# Patient Record
Sex: MALE | Race: WHITE | NOT HISPANIC OR LATINO
[De-identification: names, ages, dates, MRNs, and addresses within clinical notes are randomized per-mention and may not be internally consistent; named-entity substitution may affect disease eponyms.]

---

## 2023-08-24 ENCOUNTER — TRANSCRIBE ORDERS (OUTPATIENT)
Dept: OTHER | Age: 23
End: 2023-08-24

## 2023-08-24 DIAGNOSIS — G40.909 SEIZURE DISORDER (H): Primary | ICD-10-CM

## 2023-09-12 ENCOUNTER — HOSPITAL ENCOUNTER (OUTPATIENT)
Facility: CLINIC | Age: 23
End: 2023-09-12
Attending: PSYCHIATRY & NEUROLOGY | Admitting: PSYCHIATRY & NEUROLOGY
Payer: COMMERCIAL

## 2023-10-20 ENCOUNTER — MEDICAL CORRESPONDENCE (OUTPATIENT)
Dept: HEALTH INFORMATION MANAGEMENT | Facility: CLINIC | Age: 23
End: 2023-10-20

## 2023-12-18 ENCOUNTER — ANCILLARY PROCEDURE (OUTPATIENT)
Dept: NEUROLOGY | Facility: CLINIC | Age: 23
End: 2023-12-18
Payer: COMMERCIAL

## 2023-12-18 ENCOUNTER — OFFICE VISIT (OUTPATIENT)
Dept: NEUROLOGY | Facility: CLINIC | Age: 23
End: 2023-12-18
Payer: COMMERCIAL

## 2023-12-18 VITALS
DIASTOLIC BLOOD PRESSURE: 66 MMHG | TEMPERATURE: 97.2 F | HEART RATE: 88 BPM | BODY MASS INDEX: 42.52 KG/M2 | SYSTOLIC BLOOD PRESSURE: 130 MMHG | WEIGHT: 255.2 LBS | HEIGHT: 65 IN

## 2023-12-18 DIAGNOSIS — G40.909 SEIZURE DISORDER (H): ICD-10-CM

## 2023-12-18 RX ORDER — LAMOTRIGINE 100 MG/1
TABLET ORAL
Qty: 270 TABLET | Refills: 3 | Status: SHIPPED | OUTPATIENT
Start: 2023-12-18 | End: 2024-08-01

## 2023-12-18 RX ORDER — LACOSAMIDE 200 MG/1
200 TABLET ORAL 2 TIMES DAILY
COMMUNITY
Start: 2023-04-25 | End: 2024-03-13

## 2023-12-18 RX ORDER — LAMOTRIGINE 25 MG/1
TABLET ORAL
Qty: 200 TABLET | Refills: 1 | Status: SHIPPED | OUTPATIENT
Start: 2023-12-18 | End: 2024-03-28

## 2023-12-18 ASSESSMENT — PAIN SCALES - GENERAL: PAINLEVEL: NO PAIN (0)

## 2023-12-18 NOTE — PATIENT INSTRUCTIONS
Impression:    Recurrent seizure like spells (he may have both seizure and non epileptic spells)   Down Syndrome   Abnormal EEG with midline epileptiform discharges     Discussion:   Juan Pablo is 23 right-handed male with Down syndrome, recurrent seizure like spells, and abnormal EEG with midline epileptiform discharges.  He has had recurrent stereotyped spells since 2018 consisting of staring, partial responsiveness, nonrhythmic upper and lower extremity shaking, hip thrusting, back arching which may last 30 to 60 minutes in duration.  Over the years he has tried many seizure medications including divalproex, levetiracetam, topiramate, and lacosamide with no meaningful improvement in the frequency of these events.  Unfortunately over the years the spells have gradually worsened and now they are occurring 3-4 times a month.  He has been reviewed with video EEG monitoring over 5 times with no target spells being recorded.  He has been followed at [facility] since 2021 by Dr. 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  MRI of the brain had some questionable changes in the left mesial  region but it is not entirely clear if he has mesial temporal sclerosis, however, not definite, EEGs are notable for midline - central epileptiform discharges.     I reviewed several videos today of his target events and they appear clinically based on my impression to be psychogenic nonepileptic spells.  Since he does have a positive EEG with focal epileptiform discharges in the midline it would be best for us to continue antiseizure medications.  It may be difficult to differentiate what is a seizure versus a psychogenic nonepileptic spells in Clement because we have not reported target spells.  He does have cognitive disability and may have challenges processing his emotions or  stressors.  On this visit I spoke extensively to mom to focus on calming him down during an event which consist of breathing, rubbing his arms, asking him to open and close his hands.  It is critical that mom maintain calmness during these events because it is most likely that Clement mirrors her emotions when she gets worried during these events.  Additionally may be helpful to try to find a therapist who works with clients with Down syndrome to identify strategies to calm him down, have hobbies or activities that may be  be stress relieving for him.  They are also planning to move to [place] at which point he will be involved in a center for daily activities which will be very helpful for him emotionally.     Additionally he does have severe fatigue on the Vimpat 200 mg twice a day requiring over 12 hours of sleep per day.  We will start him on lamotrigine, I reviewed side effects including Isaac-Arnold rash, nausea, vomiting, dizziness, arrhythmia, double vision.  I suspect lamotrigine will have added benefits of seizure control and mood stabilization which may be beneficial for him.  Additionally there is a positive history of epilepsy in the family and a genetic evaluation may be useful. He should continue lacosamide until lamotrigine is at target dose. I would treat with lamotrigine because he has abnormal EEG.     He is scheduled for an inpatient video EEG evaluation and mom would like to cancel because she has completed this numerous times with no positive field.  She would like to focus on his emotional health and seizure medication changes at this time which I believe is a reasonable approach.    Lastly I encouraged mom to talk to primary care doctor to complete a cardiac evaluation to rule out cardiac etiology causing him to collapse, especially because he has Down syndrome.  And I have ordered a genetics consult.     Plan :     Cancel inpatient video EEG admission  Continue lacosamide 200 mg twice  a day  Consult with primary care provider to get cardiac holter to rule out arrthymia   When Clement has a target event focus on calming him down, breathing, opening and closing his hand to reconnect with his body  Reduce emergency medical services unless he has significant bodily injury breathing distress or other medical concerns that require acute intervention or evaluation  Participating in an active day program with socialization will be very helpful in reducing the spells  Mental health therapy     Start Lamictal  Lamotrigine IR Script: Week 1-2: lamotrigine 25 mg am, week 3-4: 25 mg twice a day, week 5: 50 mg am and 25 mg pm. Week 6: 50 mg twice a day, week 7: 75 mg am and 50 mg pm, week 8: 75 mg am and 75 mg pm. week 9: 100 mg am and 75 mg pm. Week 10: 100 mg am and 100 mg pm. Week 11: 125 mg am and 100 mg pm. Week 12: 150 mg am and 100 mg pm. Week 13: 175 mg am and 100 mg pm. Week 14 -onward: 200 mg am and 100 mg pm.        Times of Days           Medication Tablet Size Number of Tablets/Capsules Notes      Lamotrigine      7 AM after breakfast  (Morning)   6 PM after dinner  (Night)        Week 1-2      25 mg  (1 tablet)      0 mg        Week 3-4       25 mg   (1 tablet)    25 mg   (1 tablet)       Week 5      50 mg   (2 tablets)   25 mg   (1 tablet)       Week 6      50 mg   (2 tablets)   50 mg   (2 tablets)       Week 7     75 mg   (3 tablets)   50 mg   (2 tablets)       Week 8      75 mg   (3 tablets)   75 mg   (3 tablets)      Week  9   100 mg   (4 tablets)  75 mg   (3 tablets)     Week 10   100 mg   (4 tablets)  100 mg   (4 tablets)     Week 11   125 mg   (5 tablets)  100 mg   (4 tablets)     Week 12   150 mg     100 mg       Week 13   175 mg     100 mg        Week 14   200 mg     100 mg          Monitor for side effects, especially rash (such as [place] Arnold rash which can be life threatening) and mood changes, if any concerns please call our office. [phone] if he has a rash - if he has a  rash he will need to take loratadine 10 mg daily, reduce lamotrigine to week prior, and call [facility], take a picture of the rash and send to [facility]      Follow up  Olivia in 10 weeks via video visit or phone call.  Then with Dr. Charles 2-3 months    The patient received guidance on maintaining appropriate seizure precautions. [place]'s driving regulations were discussed, ensuring the patient's understanding of the following restrictions: They are prohibited from operating a motor vehicle within 3 months following any seizure or episode involving sudden unconsciousness or an inability to sit up. Furthermore, they are required to report their most recent seizure to the DMV within 30 days after the event.  The patient was also advised to steer clear of activities that could potentially result in self-injury or harm to others within 3 months following any seizure with impaired awareness or impaired motor control. These activities encompass, but are not limited to, operating power tools, handling firearms, climbing ladders, trees, or engaging in activities at heights that pose a fall risk. They should refrain from operating power tools or heavy machinery and equipment. Additionally, the patient was counseled against swimming alone, and they should not bathe in any type of tub, including bathtubs, jacuzzis, or hot tubs, unless there is a responsible adult present nearby to provide assistance in the event of a seizure to prevent drowning. Lastly, they were advised not to work with or near hot surfaces such as stoves, ovens, or scalding hot water to minimize risks associated with impaired awareness or motor control during seizures.    Letha Charles MD

## 2023-12-18 NOTE — LETTER
2023       RE: Clement George  : 2000   MRN: [id number]      Dear Colleague,    Thank you for referring your patient, Clement George, to the [facility] EPILEPSY CARE at [facility]. Please see a copy of my visit note below.    [facility] and Physical       Patient:  Clement George  :  2000   Age:  23 year old   Today's Office Visit:  2023    Referring Provider:    Referred Self, MD  No address on file    History of Present Illness:  Kendra (mom and POA) came for this visit. Clement George is 23 year old right handed who presents with seizure like spells. These spells started 2018 (age 18). Over the years the spells have worsened. He started lacosamide 2022 and this has not helped.  Mom states that he has had extensive video EEG and ambulatory EEG with video completed with no target spells recorded.  He does have epileptiform discharges midline at Cz.  There is a strong family history of epilepsy with mom having seizures that are generalized tonic-clonic seizures and grandmother had generalized tonic-clonic seizures.   It seems over the last 5 years they have tried different medication options and multiple attempts to capture target spells which have been ineffective.  He continues to have 3-4 spells per month that are extremely distressing to him and mom.  They have seen 3 neurologists that have not been able to identify what these target spells are and the spells are unresponsive to medications.  He is currently on lacosamide 200 mg twice a day and is very sleepy on it.   He does not have a known history of generalized tonic-clonic seizures or episodes of staring with unresponsiveness.  Mom states he goes to sleep at 6 PM at night and wakes up the next day around 7 AM getting in approximately 12 hours of sleep at night and he may take a nap during the daytime.  He is sensitive to his seizure  "medications.  He stopped working in the spring 2023 and he has not had a lot to do at home.  They are moving to [place] so he can be engaged in a day program called shared living which has activities during the day and clients that may have other medical issues allowing him to have a community.  Mom states he has not had significant life trauma in his life.  Home specifically she is  and main caregiver and he lives with her.  Dad intermittently does see him and they are able to spend time together.      EMR NOTES:   Overall there are many medical records in the chart.  He has had 3 video EEG admission at [facility] EEG admission lasting 3 to 7 days in duration.  He had 2 home video EEG evaluations with each evaluation being approximately 3 days in duration.  During all of these video EEG evaluations he did not have target spells.  It seems early on in February 2023 [facility] notes state that their impression is he has focal epilepsy symptomatic, without status epilepticus.  As to review the [facility] notes it seems that seizure medications over time were not efficacious and he continued to have spells.   [facility] notes from spring and summer 2023 use language stating that they think spells may be consistent with psychogenic nonepileptic spells and this was reviewed with mom.  However I am not able to find a detailed note from the [facility] visits.  \"There are features of these events that suggest the possibility of non-epileptic events. We have done multiple prolonged EEGs, and he has not had an event on prolonged EEG despite our best attempts. I told the ED physicians that if Clement is safe and comfortable at home, breathing on his, without signs of injury, then these may be able to be managed at home and that he does not necessarily need to come to the ED with each of these. I also stated that it still would be helpful to try and record one of these events. Therefore, I will order another at-home EEG, this time " "as a 5 day study. Hopefully, we will be able to capture an event, as it sounds like they are currently occurring about daily.   Electronically signed by Marcy Ghotra M.D. at 11/17/2023 8:22 PM CST \"    \"I called to update the patient's mom on his Stratus EEG, which recorded no events. I shared with the mother that I am concerned these are nonepileptic events, as we have done 3 EMU stays and 2 [facility] EEG studies and not recorded any events. In addition, his mother shared that at times she can talk him out of the events. She is quite frustrated and continues to think that these are likely seizures. She inquires about further evaluation or options for management at any Center. I discussed how it may be helpful for him to be managed locally given the logistics traveling for evaluations and EEGs. However, they have already been evaluated by the closest neurologist who referred them to us. At this time, I have recommended her video taping further events with her cell phone and sending them in. I am happy to review these. I think he can continue lacosamide for now given the interictal activity seen on EEG.\"  Electronically signed by Marcy Ghotra M.D. at 05/30/2023 4:36 PM CDT    Spell/Seizure type 1:  Target spells are described as no warning sign and he may fall off a chair or get to the ground when he has the spells.  During the time his eyes are open, he has nonrhythmic shaking of his upper and lower extremity with some truncal and axial shaking.  There is no obvious oral or hand automatisms, there is no foaming at the mouth, there is no tongue biting, there is no bodily injury.  There was rare instances where he fell off a chair and might have scraped his knee.  The spells are especially noted to happen early morning after waking up or late in the evening around suppertime.  Mom has not specifically noticed that he has more spells during acute stress times.   During target spells if mom were to ask him to " "squeeze her hand he is able to squeeze her hand and does appear to have a maintain level of awareness. Frequency 3-4 per month. Duration 30-60 minutes.  Other descriptions in the medical notes are \"They are typified by staring and reduced awareness with a possible right head turn and possibly back arching and hip thrusting. Eyes are open with extremity twitching lasting 5-10 seconds but then waxing and waning and occurring intermittently over a period of about 45 minutes.\"     Epilepsy Risk Factors:    He does have Down syndrome  and had developmental delays in speech and walking well withPatient has no history of encephalitis/meningitis, no history of stroke, no history of tumor, no history of traumatic brain injury.  The patient had a normal birth and delivery.  No developmental delays noted.  No febrile seizures.  Mom has epilepsy (mom takes carbamazepine and she has generalized tonic-clonic convulsion), grandmother had seizure (generalized tonic-clonic convulsion she took carbamazepine and phenytoin).   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  Family history:    Mom has epilepsy (mom takes carbamazepine and she has generalized tonic-clonic convulsion), grandmother had seizure (generalized tonic-clonic convulsion she took carbamazepine and phenytoin).   Psycho-Social History: Clement George currently lives with mom. He would like to work and be busy. Mom is not sure if he is depressed. Does not " smoke, no alcohol use, no recreational drug use. We reviewed importance of mental and emotional wellbeing and impact on health. He stopped working May 2023. y 12 hours of sleep at night and he may take a nap during the daytime.  He is sensitive to his seizure medications.  He stopped working in the spring 2023 and he has not had a lot to do at home.  They are moving to [place] so he can be engaged in a day program called shared living which has activities during the day and clients that may have other medical issues allowing him to have a community.  Mom states he has not had significant life trauma in his life.  Home specifically she is  and main caregiver and he lives with her.  Dad intermittently does see him and they are able to spend time together.     Driving:  Currently patient is:  Not driving.  Previous Evaluations for Epilepsy:    EE2023: CLINICAL INTERPRETATION   The EEG showed:   1) Rare Cz spikes during sleep that may suggest a tendency for focal   seizures in the appropriate clinical context.   2) Mild nonspecific diffuse slowing of the background.     No typical events were captured during the recording period.     EEG CLASSIFICATION   SPECIAL STUDY - Long-term video EEG monitoring.   Dysrhythmia grade 1 generalized.   Sleep - activation of Cz spikes.   EKG channel.     EEG REPORT   Background   The awake recording revealed 11 Hz posterior dominant rhythm.  There is   abundant 6-7 hertz diffuse slowing.  The sleep recording contained normal   symmetric sleep architecture.        Discharges and seizures   INTERICTAL DISCHARGES: During sleep, there are rare low-amplitude midline   central (Cz) sharp wave discharges which at times occur in trains.       CLINICAL EVENTS:  During the monitoring session, the patient had no   clinical or push button events of concern.       The EKG was unremarkable.      Cz spike vs V-wave example    EE2023  INTERPRETATION AND CLINICAL CORRELATE  "  This computer-assisted prolonged video EEG recording showed:   1. Mild diffuse slowing which is a non specific indicator of a mild global   disturbance of cerebral function   2. Rare sleep activated midline central potentially epileptiform   discharges during sleep indicating a propensity for focal seizures from   this region   3. No events or seizures recorded.         MRI of Brain: 10/31/2022: FINDINGS: Unchanged since the prior study. No evidence of mass, heterotopia or focal cortical dysplasia. The body of the left hippocampus is slightly smaller than the right (350/84, 650/87,  3/55), but the T2 signal within the left hippocampus is normal. Therefore, mesial temporal sclerosis  is unlikely. Few tiny perivascular spaces in the rostral cerebral white matter. Mild to moderate  fluid in the right mastoid air cells and mild within the left. No other definite abnormality, with  the exam limited due to patient motion artifact.    IMPRESSION:  Cannot entirely exclude left-sided mesial temporal sclerosis. Otherwise nothing  structural to explain the patient's seizures.   Exam:    /66 (BP Location: Right arm, Patient Position: Sitting, Cuff Size: Adult Large)   Pulse 88   Temp 97.2  F (36.2  C) (Temporal)   Ht 5' 5\" (165.1 cm)   Wt 255 lb 3.2 oz (115.8 kg)   BMI 42.47 kg/m       Wt Readings from Last 5 Encounters:   12/18/23 255 lb 3.2 oz (115.8 kg)       Alert, cognitive delay, smiling, cooperative,  Down syndrome's features, face is symmetric, no upper or lower  extremity weakness, wide-based gait.   No sensory loss in upper and lower extremities.     Impression:    Recurrent seizure like spells (he may have both seizure and non epileptic spells)   Down Syndrome   Abnormal EEG with midline epileptiform discharges     Discussion:   Juan Pablo is 23 right-handed male with Down syndrome, recurrent seizure like spells, and abnormal EEG with midline epileptiform discharges.  He has had recurrent stereotyped spells " since 2018 consisting of staring, partial responsiveness, nonrhythmic upper and lower extremity shaking, hip thrusting, back arching which may last 30 to 60 minutes in duration.  Over the years he has tried many seizure medications including divalproex, levetiracetam, topiramate, and lacosamide with no meaningful improvement in the frequency of these events.  Unfortunately over the years the spells have gradually worsened and now they are occurring 3-4 times a month.  He has been reviewed with video EEG monitoring over 5 times with no target spells being recorded.  He has been followed at [facility] since 2021 by Dr. 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  MRI of the brain had some questionable changes in the left mesial  region but it is not entirely clear if he has mesial temporal sclerosis, however, not definite, EEGs are notable for midline - central epileptiform discharges.     I reviewed several videos today of his target events and they appear clinically based on my impression to be psychogenic nonepileptic spells.  Since he does have a positive EEG with focal epileptiform discharges in the midline it would be best for us to continue antiseizure medications.  It may be difficult to differentiate what is a seizure versus a psychogenic nonepileptic spells in Clement because we have not reported target spells.  He does have cognitive disability and may have challenges processing his emotions or stressors.  On this visit I spoke extensively to mom to focus on calming him down during an event which consist of breathing, rubbing his arms, asking him to open and close his hands.  It is critical that mom maintain calmness during these events because it is most likely that Clement mirrors her emotions when she gets worried during these events.  Additionally may be  helpful to try to find a therapist who works with clients with Down syndrome to identify strategies to calm him down, have hobbies or activities that may be  be stress relieving for him.  They are also planning to move to [place] at which point he will be involved in a center for daily activities which will be very helpful for him emotionally.     Additionally he does have severe fatigue on the Vimpat 200 mg twice a day requiring over 12 hours of sleep per day.  We will start him on lamotrigine, I reviewed side effects including Isaac-Arnold rash, nausea, vomiting, dizziness, arrhythmia, double vision.  I suspect lamotrigine will have added benefits of seizure control and mood stabilization which may be beneficial for him.  Additionally there is a positive history of epilepsy in the family and a genetic evaluation may be useful. He should continue lacosamide until lamotrigine is at target dose. I would treat with lamotrigine because he has abnormal EEG.     He is scheduled for an inpatient video EEG evaluation and mom would like to cancel because she has completed this numerous times with no positive field.  She would like to focus on his emotional health and seizure medication changes at this time which I believe is a reasonable approach.    Lastly I encouraged mom to talk to primary care doctor to complete a cardiac evaluation to rule out cardiac etiology causing him to collapse, especially because he has Down syndrome.  And I have ordered a genetics consult.       Plan :     Cancel inpatient video EEG admission  Continue lacosamide 200 mg twice a day  Consult with primary care provider to get cardiac holter to rule out arrthymia   When Clement has a target event focus on calming him down, breathing, opening and closing his hand to reconnect with his body  Reduce emergency medical services unless he has significant bodily injury breathing distress or other medical concerns that require acute intervention or  evaluation  Participating in an active day program with socialization will be very helpful in reducing the spells    Start Lamictal  Lamotrigine IR Script: Week 1-2: lamotrigine 25 mg am, week 3-4: 25 mg twice a day, week 5: 50 mg am and 25 mg pm. Week 6: 50 mg twice a day, week 7: 75 mg am and 50 mg pm, week 8: 75 mg am and 75 mg pm. week 9: 100 mg am and 75 mg pm. Week 10: 100 mg am and 100 mg pm. Week 11: 125 mg am and 100 mg pm. Week 12: 150 mg am and 100 mg pm. Week 13: 175 mg am and 100 mg pm. Week 14 -onward: 200 mg am and 100 mg pm.        Times of Days           Medication Tablet Size Number of Tablets/Capsules Notes      Lamotrigine      7 AM after breakfast  (Morning)   6 PM after dinner  (Night)        Week 1-2      25 mg  (1 tablet)      0 mg        Week 3-4       25 mg   (1 tablet)    25 mg   (1 tablet)       Week 5      50 mg   (2 tablets)   25 mg   (1 tablet)       Week 6      50 mg   (2 tablets)   50 mg   (2 tablets)       Week 7     75 mg   (3 tablets)   50 mg   (2 tablets)       Week 8      75 mg   (3 tablets)   75 mg   (3 tablets)      Week  9   100 mg   (4 tablets)  75 mg   (3 tablets)     Week 10   100 mg   (4 tablets)  100 mg   (4 tablets)     Week 11   125 mg   (5 tablets)  100 mg   (4 tablets)     Week 12   150 mg     100 mg       Week 13   175 mg     100 mg        Week 14   200 mg     100 mg          Monitor for side effects, especially rash (such as [place] Arnold rash which can be life threatening) and mood changes, if any concerns please call our office. [phone] if he has a rash - if he has a rash he will need to take loratadine 10 mg daily, reduce lamotrigine to week prior, and call [facility], take a picture of the rash and send to [facility]        Follow up  Olivia in 10 weeks via video visit or phone call.  Then with Dr. Charles 2-3 months    The patient received guidance on maintaining appropriate seizure precautions. [place]'s driving regulations were discussed, ensuring the  patient's understanding of the following restrictions: They are prohibited from operating a motor vehicle within 3 months following any seizure or episode involving sudden unconsciousness or an inability to sit up. Furthermore, they are required to report their most recent seizure to the DMV within 30 days after the event.  The patient was also advised to steer clear of activities that could potentially result in self-injury or harm to others within 3 months following any seizure with impaired awareness or impaired motor control. These activities encompass, but are not limited to, operating power tools, handling firearms, climbing ladders, trees, or engaging in activities at heights that pose a fall risk. They should refrain from operating power tools or heavy machinery and equipment. Additionally, the patient was counseled against swimming alone, and they should not bathe in any type of tub, including bathtubs, jacuzzis, or hot tubs, unless there is a responsible adult present nearby to provide assistance in the event of a seizure to prevent drowning. Lastly, they were advised not to work with or near hot surfaces such as stoves, ovens, or scalding hot water to minimize risks associated with impaired awareness or motor control during seizures.    I spent 71minutes in total today to provide comprehensive  medical care.   I spent 6 minutes writing the note and placing orders.   I spent 6 minutes  reviewing the chart.     The rest of the time was spent with the patient in face to face interview. During this time key medical decisions were made with review of medical chart prior to visit, visit with patient, counseling/education, and post visit work, including documentation of note on the day of visit. I addressed all questions the patient/caregiver raised in regards to epilepsy or related medical questions.         Again, thank you for allowing me to participate in the care of your patient.      Sincerely,    Letha HIGH  MD [place]

## 2023-12-18 NOTE — PROGRESS NOTES
[facility] Epilepsy Care History and Physical       Patient:  Clement George  :  2000   Age:  23 year old   Today's Office Visit:  2023    Referring Provider:    Referred Self, MD  No address on file    History of Present Illness:  Kendra (mom and POA) came for this visit. Clement George is 23 year old right handed who presents with seizure like spells. These spells started 2018 (age 18). Over the years the spells have worsened. He started lacosamide 2022 and this has not helped.  Mom states that he has had extensive video EEG and ambulatory EEG with video completed with no target spells recorded.  He does have epileptiform discharges midline at Cz.  There is a strong family history of epilepsy with mom having seizures that are generalized tonic-clonic seizures and grandmother had generalized tonic-clonic seizures.   It seems over the last 5 years they have tried different medication options and multiple attempts to capture target spells which have been ineffective.  He continues to have 3-4 spells per month that are extremely distressing to him and mom.  They have seen 3 neurologists that have not been able to identify what these target spells are and the spells are unresponsive to medications.  He is currently on lacosamide 200 mg twice a day and is very sleepy on it.   He does not have a known history of generalized tonic-clonic seizures or episodes of staring with unresponsiveness.  Mom states he goes to sleep at 6 PM at night and wakes up the next day around 7 AM getting in approximately 12 hours of sleep at night and he may take a nap during the daytime.  He is sensitive to his seizure medications.  He stopped working in the spring 2023 and he has not had a lot to do at home.  They are moving to [place] so he can be engaged in a day program called shared living which has activities during the day and clients that may have other medical issues allowing him to have a community.  Mom  "states he has not had significant life trauma in his life.  Home specifically she is  and main caregiver and he lives with her.  Dad intermittently does see him and they are able to spend time together.      EMR NOTES:   Overall there are many medical records in the chart.  He has had 3 video EEG admission at [facility] EEG admission lasting 3 to 7 days in duration.  He had 2 home video EEG evaluations with each evaluation being approximately 3 days in duration.  During all of these video EEG evaluations he did not have target spells.  It seems early on in February 2023 [facility] notes state that their impression is he has focal epilepsy symptomatic, without status epilepticus.  As to review the [facility] notes it seems that seizure medications over time were not efficacious and he continued to have spells.   [facility] notes from spring and summer 2023 use language stating that they think spells may be consistent with psychogenic nonepileptic spells and this was reviewed with mom.  However I am not able to find a detailed note from the [facility] visits.  \"There are features of these events that suggest the possibility of non-epileptic events. We have done multiple prolonged EEGs, and he has not had an event on prolonged EEG despite our best attempts. I told the ED physicians that if Clement is safe and comfortable at home, breathing on his, without signs of injury, then these may be able to be managed at home and that he does not necessarily need to come to the ED with each of these. I also stated that it still would be helpful to try and record one of these events. Therefore, I will order another at-home EEG, this time as a 5 day study. Hopefully, we will be able to capture an event, as it sounds like they are currently occurring about daily.   Electronically signed by Marcy Ghotra M.D. at 11/17/2023 8:22 PM CST \"    \"I called to update the patient's mom on his Stratus EEG, which recorded no events. I shared with " "the mother that I am concerned these are nonepileptic events, as we have done 3 EMU stays and 2 Str[facility] EEG studies and not recorded any events. In addition, his mother shared that at times she can talk him out of the events. She is quite frustrated and continues to think that these are likely seizures. She inquires about further evaluation or options for management at any Center. I discussed how it may be helpful for him to be managed locally given the logistics traveling for evaluations and EEGs. However, they have already been evaluated by the closest neurologist who referred them to us. At this time, I have recommended her video taping further events with her cell phone and sending them in. I am happy to review these. I think he can continue lacosamide for now given the interictal activity seen on EEG.\"  Electronically signed by Marcy Ghotra M.D. at 05/30/2023 4:36 PM CDT    Spell/Seizure type 1:  Target spells are described as no warning sign and he may fall off a chair or get to the ground when he has the spells.  During the time his eyes are open, he has nonrhythmic shaking of his upper and lower extremity with some truncal and axial shaking.  There is no obvious oral or hand automatisms, there is no foaming at the mouth, there is no tongue biting, there is no bodily injury.  There was rare instances where he fell off a chair and might have scraped his knee.  The spells are especially noted to happen early morning after waking up or late in the evening around suppertime.  Mom has not specifically noticed that he has more spells during acute stress times.   During target spells if mom were to ask him to squeeze her hand he is able to squeeze her hand and does appear to have a maintain level of awareness. Frequency 3-4 per month. Duration 30-60 minutes.  Other descriptions in the medical notes are \"They are typified by staring and reduced awareness with a possible right head turn and possibly back arching " "and hip thrusting. Eyes are open with extremity twitching lasting 5-10 seconds but then waxing and waning and occurring intermittently over a period of about 45 minutes.\"     Epilepsy Risk Factors:    He does have Down syndrome  and had developmental delays in speech and walking well withPatient has no history of encephalitis/meningitis, no history of stroke, no history of tumor, no history of traumatic brain injury.  The patient had a normal birth and delivery.  No developmental delays noted.  No febrile seizures.  Mom has epilepsy (mom takes carbamazepine and she has generalized tonic-clonic convulsion), grandmother had seizure (generalized tonic-clonic convulsion she took carbamazepine and phenytoin).   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  Family history:    Mom has epilepsy (mom takes carbamazepine and she has generalized tonic-clonic convulsion), grandmother had seizure (generalized tonic-clonic convulsion she took carbamazepine and phenytoin).   Psycho-Social History: Clement George currently lives with mom. He would like to work and be busy. Mom is not sure if he is depressed. Does not smoke, no alcohol use, no recreational drug use. We reviewed importance of mental and emotional wellbeing and impact on health. He stopped working May 2023. y 12 hours of sleep at night and he may take a nap during the daytime.  He is sensitive to his seizure medications.  He stopped working in the " spring 2023 and he has not had a lot to do at home.  They are moving to [place] so he can be engaged in a day program called shared living which has activities during the day and clients that may have other medical issues allowing him to have a community.  Mom states he has not had significant life trauma in his life.  Home specifically she is  and main caregiver and he lives with her.  Dad intermittently does see him and they are able to spend time together.     Driving:  Currently patient is:  Not driving.  Previous Evaluations for Epilepsy:    EE2023: CLINICAL INTERPRETATION   The EEG showed:   1) Rare Cz spikes during sleep that may suggest a tendency for focal   seizures in the appropriate clinical context.   2) Mild nonspecific diffuse slowing of the background.     No typical events were captured during the recording period.     EEG CLASSIFICATION   SPECIAL STUDY - Long-term video EEG monitoring.   Dysrhythmia grade 1 generalized.   Sleep - activation of Cz spikes.   EKG channel.     EEG REPORT   Background   The awake recording revealed 11 Hz posterior dominant rhythm.  There is   abundant 6-7 hertz diffuse slowing.  The sleep recording contained normal   symmetric sleep architecture.        Discharges and seizures   INTERICTAL DISCHARGES: During sleep, there are rare low-amplitude midline   central (Cz) sharp wave discharges which at times occur in trains.       CLINICAL EVENTS:  During the monitoring session, the patient had no   clinical or push button events of concern.       The EKG was unremarkable.      Cz spike vs V-wave example    EE2023  INTERPRETATION AND CLINICAL CORRELATE   This computer-assisted prolonged video EEG recording showed:   1. Mild diffuse slowing which is a non specific indicator of a mild global   disturbance of cerebral function   2. Rare sleep activated midline central potentially epileptiform   discharges during sleep indicating a propensity for focal  "seizures from   this region   3. No events or seizures recorded.         MRI of Brain: 10/31/2022: FINDINGS: Unchanged since the prior study. No evidence of mass, heterotopia or focal cortical dysplasia. The body of the left hippocampus is slightly smaller than the right (350/84, 650/87,  3/55), but the T2 signal within the left hippocampus is normal. Therefore, mesial temporal sclerosis  is unlikely. Few tiny perivascular spaces in the rostral cerebral white matter. Mild to moderate  fluid in the right mastoid air cells and mild within the left. No other definite abnormality, with  the exam limited due to patient motion artifact.    IMPRESSION:  Cannot entirely exclude left-sided mesial temporal sclerosis. Otherwise nothing  structural to explain the patient's seizures.   Exam:    /66 (BP Location: Right arm, Patient Position: Sitting, Cuff Size: Adult Large)   Pulse 88   Temp 97.2  F (36.2  C) (Temporal)   Ht 5' 5\" (165.1 cm)   Wt 255 lb 3.2 oz (115.8 kg)   BMI 42.47 kg/m       Wt Readings from Last 5 Encounters:   12/18/23 255 lb 3.2 oz (115.8 kg)       Alert, cognitive delay, smiling, cooperative,  Down syndrome's features, face is symmetric, no upper or lower  extremity weakness, wide-based gait.   No sensory loss in upper and lower extremities.     Impression:    Recurrent seizure like spells (he may have both seizure and non epileptic spells)   Down Syndrome   Abnormal EEG with midline epileptiform discharges     Discussion:   Juan Pablo is 23 right-handed male with Down syndrome, recurrent seizure like spells, and abnormal EEG with midline epileptiform discharges.  He has had recurrent stereotyped spells since 2018 consisting of staring, partial responsiveness, nonrhythmic upper and lower extremity shaking, hip thrusting, back arching which may last 30 to 60 minutes in duration.  Over the years he has tried many seizure medications including divalproex, levetiracetam, topiramate, and lacosamide with " no meaningful improvement in the frequency of these events.  Unfortunately over the years the spells have gradually worsened and now they are occurring 3-4 times a month.  He has been reviewed with video EEG monitoring over 5 times with no target spells being recorded.  He has been followed at [facility] since 2021 by Dr. 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  MRI of the brain had some questionable changes in the left mesial  region but it is not entirely clear if he has mesial temporal sclerosis, however, not definite, EEGs are notable for midline - central epileptiform discharges.     I reviewed several videos today of his target events and they appear clinically based on my impression to be psychogenic nonepileptic spells.  Since he does have a positive EEG with focal epileptiform discharges in the midline it would be best for us to continue antiseizure medications.  It may be difficult to differentiate what is a seizure versus a psychogenic nonepileptic spells in Clement because we have not reported target spells.  He does have cognitive disability and may have challenges processing his emotions or stressors.  On this visit I spoke extensively to mom to focus on calming him down during an event which consist of breathing, rubbing his arms, asking him to open and close his hands.  It is critical that mom maintain calmness during these events because it is most likely that Clement mirrors her emotions when she gets worried during these events.  Additionally may be helpful to try to find a therapist who works with clients with Down syndrome to identify strategies to calm him down, have hobbies or activities that may be  be stress relieving for him.  They are also planning to move to [place] at which point he will be involved in a center for daily  activities which will be very helpful for him emotionally.     Additionally he does have severe fatigue on the Vimpat 200 mg twice a day requiring over 12 hours of sleep per day.  We will start him on lamotrigine, I reviewed side effects including Isaac-Arnold rash, nausea, vomiting, dizziness, arrhythmia, double vision.  I suspect lamotrigine will have added benefits of seizure control and mood stabilization which may be beneficial for him.  Additionally there is a positive history of epilepsy in the family and a genetic evaluation may be useful. He should continue lacosamide until lamotrigine is at target dose. I would treat with lamotrigine because he has abnormal EEG.     He is scheduled for an inpatient video EEG evaluation and mom would like to cancel because she has completed this numerous times with no positive field.  She would like to focus on his emotional health and seizure medication changes at this time which I believe is a reasonable approach.    Lastly I encouraged mom to talk to primary care doctor to complete a cardiac evaluation to rule out cardiac etiology causing him to collapse, especially because he has Down syndrome.  And I have ordered a genetics consult.       Plan :     Cancel inpatient video EEG admission  Continue lacosamide 200 mg twice a day  Consult with primary care provider to get cardiac holter to rule out arrthymia   When Clement has a target event focus on calming him down, breathing, opening and closing his hand to reconnect with his body  Reduce emergency medical services unless he has significant bodily injury breathing distress or other medical concerns that require acute intervention or evaluation  Participating in an active day program with socialization will be very helpful in reducing the spells    Start Lamictal  Lamotrigine IR Script: Week 1-2: lamotrigine 25 mg am, week 3-4: 25 mg twice a day, week 5: 50 mg am and 25 mg pm. Week 6: 50 mg twice a day, week 7: 75 mg am  and 50 mg pm, week 8: 75 mg am and 75 mg pm. week 9: 100 mg am and 75 mg pm. Week 10: 100 mg am and 100 mg pm. Week 11: 125 mg am and 100 mg pm. Week 12: 150 mg am and 100 mg pm. Week 13: 175 mg am and 100 mg pm. Week 14 -onward: 200 mg am and 100 mg pm.        Times of Days           Medication Tablet Size Number of Tablets/Capsules Notes      Lamotrigine      7 AM after breakfast  (Morning)   6 PM after dinner  (Night)        Week 1-2      25 mg  (1 tablet)      0 mg        Week 3-4       25 mg   (1 tablet)    25 mg   (1 tablet)       Week 5      50 mg   (2 tablets)   25 mg   (1 tablet)       Week 6      50 mg   (2 tablets)   50 mg   (2 tablets)       Week 7     75 mg   (3 tablets)   50 mg   (2 tablets)       Week 8      75 mg   (3 tablets)   75 mg   (3 tablets)      Week  9   100 mg   (4 tablets)  75 mg   (3 tablets)     Week 10   100 mg   (4 tablets)  100 mg   (4 tablets)     Week 11   125 mg   (5 tablets)  100 mg   (4 tablets)     Week 12   150 mg     100 mg       Week 13   175 mg     100 mg        Week 14   200 mg     100 mg          Monitor for side effects, especially rash (such as [place] Arnold rash which can be life threatening) and mood changes, if any concerns please call our office. [phone] if he has a rash - if he has a rash he will need to take loratadine 10 mg daily, reduce lamotrigine to week prior, and call [facility], take a picture of the rash and send to [facility]        Follow up  Olivia in 10 weeks via video visit or phone call.  Then with Dr. Charles 2-3 months    The patient received guidance on maintaining appropriate seizure precautions. [place]'s driving regulations were discussed, ensuring the patient's understanding of the following restrictions: They are prohibited from operating a motor vehicle within 3 months following any seizure or episode involving sudden unconsciousness or an inability to sit up. Furthermore, they are required to report their most recent seizure to the [facility]  within 30 days after the event.  The patient was also advised to steer clear of activities that could potentially result in self-injury or harm to others within 3 months following any seizure with impaired awareness or impaired motor control. These activities encompass, but are not limited to, operating power tools, handling firearms, climbing ladders, trees, or engaging in activities at heights that pose a fall risk. They should refrain from operating power tools or heavy machinery and equipment. Additionally, the patient was counseled against swimming alone, and they should not bathe in any type of tub, including bathtubs, jacuzzis, or hot tubs, unless there is a responsible adult present nearby to provide assistance in the event of a seizure to prevent drowning. Lastly, they were advised not to work with or near hot surfaces such as stoves, ovens, or scalding hot water to minimize risks associated with impaired awareness or motor control during seizures.    I spent 71minutes in total today to provide comprehensive  medical care.   I spent 6 minutes writing the note and placing orders.   I spent 6 minutes  reviewing the chart.     The rest of the time was spent with the patient in face to face interview. During this time key medical decisions were made with review of medical chart prior to visit, visit with patient, counseling/education, and post visit work, including documentation of note on the day of visit. I addressed all questions the patient/caregiver raised in regards to epilepsy or related medical questions.       Letha Charles MD   Epilepsy Staff

## 2023-12-21 ENCOUNTER — TELEPHONE (OUTPATIENT)
Dept: CONSULT | Facility: CLINIC | Age: 23
End: 2023-12-21

## 2023-12-21 NOTE — TELEPHONE ENCOUNTER
DOROTHEAM for patient to call back to schedule new patient Genetics appointment with Dr. Raymond, Dr. Urena, Dr. Sandoval, or Dr. Owens, with GC visit prior. When patient calls back, please assist in scheduling IN PERSON appointment. If patient requests virtual visit, please route note back to Genetics scheduling pool for approval prior to scheduling.     Please advise patient to complete intake form via [employer],  as well as have outside records/previous genetic test reports sent prior to appointment date. Thank you.

## 2024-03-13 ENCOUNTER — VIRTUAL VISIT (OUTPATIENT)
Dept: NEUROLOGY | Facility: CLINIC | Age: 24
End: 2024-03-13
Payer: COMMERCIAL

## 2024-03-13 VITALS — BODY MASS INDEX: 41.65 KG/M2 | HEIGHT: 65 IN | WEIGHT: 250 LBS

## 2024-03-13 DIAGNOSIS — R40.4 NONSPECIFIC PAROXYSMAL SPELL: Primary | ICD-10-CM

## 2024-03-13 RX ORDER — LACOSAMIDE 200 MG/1
200 TABLET ORAL 2 TIMES DAILY
Qty: 180 TABLET | Refills: 1 | Status: SHIPPED | OUTPATIENT
Start: 2024-03-13 | End: 2024-08-01

## 2024-03-13 ASSESSMENT — PAIN SCALES - GENERAL: PAINLEVEL: NO PAIN (0)

## 2024-03-13 NOTE — NURSING NOTE
Patient is also taking a Probiotic daily.    Unable to complete PHQ-2 with patient.       Is the patient currently in the state of [place]? NO, in [place] where they live.    Visit mode:VIDEO    If the visit is dropped, the patient can be reconnected by: VIDEO VISIT: Text to cell phone:   Telephone Information:   Mobile [phone]       Will anyone else be joining the visit? YES: How would they like to receive their invitation? Text to cell phone: [phone] Kendra-Pt's Mother    (If patient encounters technical issues they should call [phone]    How would you like to obtain your AVS? MyChart    Are changes needed to the allergy or medication list? Yes ADD MEDICATION- Probiotic daily.    Reason for visit: RECHECK      Kayleen ROWAN

## 2024-03-13 NOTE — PROGRESS NOTES
"Virtual Visit Details    Type of service:  Video Visit   Video Start: 10:25 am   Video End: 10:59 am   Originating Location (pt. Location): Home    Distant Location (provider location):  On-site  Platform used for Video Visit: Al    [facility] Progress Note    Patient:  Clement George  :  2000   Age:  23 year old   Today's Office Visit:  3/13/2024       Interval History:   Kendra (mom and POA) was present for this visit. We started lamotrigine last visit. He stopped having spells, his last spell was . Most spells were at at night or early morning. Currently, he is taking lamotrigine 75 mg twice a day. On lamotrigine, mom states he denies experiencing dizziness, no double vision, no nausea, no vomiting, no abdominal pain, no mood changes, no ER visits, no hospitalizations, and had no significant fall with trauma.   He has a hard time expressing himself when he is upset (cognitive delay), mom states \"if he can not express what he wants to say and he bottles it up, he gets upset, and then he has a spell. He is tried from lacosamide. He takes 1 hour nap per day. We are moving to [facility] and we will get him into counseling to see if this helps. He is excited about our move. He is trying to express himself. In  he will be in a day program and have more social activities\".  He is currently on lacosamide 200 mg twice a day and is very sleepy on it.   He does not have a known history of generalized tonic-clonic seizures or episodes of staring with unresponsiveness.  They tried to do holter monitor and Clement did not tolerate holter monitor. Mom will work on cardiac evaluation as tolerated by Clement in . Mom tired to calm him down during event 2023 she focused on calming him down, breathing, opening and closing his hand to reconnect with his body. This was not successful and did not seem to help.   Spell/Seizure type 1:  Target spells are described as no warning sign " "and he may fall off a chair or get to the ground when he has the spells.  During the time his eyes are open, he has nonrhythmic shaking of his upper and lower extremity with some truncal and axial shaking.  There is no obvious oral or hand automatisms, there is no foaming at the mouth, there is no tongue biting, there is no bodily injury.  There was rare instances where he fell off a chair and might have scraped his knee.  The spells are especially noted to happen early morning after waking up or late in the evening around suppertime.  Mom has not specifically noticed that he has more spells during acute stress times.   During target spells if mom were to ask him to squeeze her hand he is able to squeeze her hand and does appear to have a maintain level of awareness. Last spell was 12/2023. Duration 30-60 minutes.  Other descriptions in the medical notes are \"They are typified by staring and reduced awareness with a possible right head turn and possibly back arching and hip thrusting. Eyes are open with extremity twitching lasting 5-10 seconds but then waxing and waning and occurring intermittently over a period of about 45 minutes.\"     Current antiepileptic drug   Lamotrigine (25 mg tablet) - 3 tablet twice a day   Lacosamide (200 mg tablet ) - 1 tablet twice a day   Current Outpatient Medications   Medication Sig Dispense Refill    lacosamide (VIMPAT) 200 MG TABS tablet Take 200 mg by mouth 2 times daily      lamoTRIgine (LAMICTAL) 100 MG tablet 200 mg morning and 100 mg 270 tablet 3    lamoTRIgine (LAMICTAL) 25 MG tablet Lamotrigine IR Script: Week 1-2: lamotrigine 25 mg am, week 3-4: 25 mg twice a day, week 5: 50 mg am and 25 mg pm. Week 6: 50 mg twice a day, week 7: 75 mg am and 50 mg pm, week 8: 75 mg am and 75 mg pm. week 9: 100 mg am and 75 mg pm. Week 10: 100 mg am and 100 mg pm. Week 11: 125 mg am and 100 mg pm. Week 12: 150 mg am and 100 mg pm. Week 13: 175 mg am and 100 mg pm. Week 14 -onward: 200 mg am " and 100 mg pm 200 tablet 1     Perceived AED Side Effects:  Yes  Medication Notes:   AED Medication Compliance:  compliant most of the time  Using a pill box:  Yes  Past AEDs:  Levetiracetam   Topiramate   Depakote 750 mg twice a day - gained weight  Psycho-Social History: Clement George currently lives with mom. He would like to work and be busy. Mom is not sure if he is depressed. Does not smoke, no alcohol use, no recreational drug use. We reviewed importance of mental and emotional wellbeing and impact on health. He stopped working May 2023. y 12 hours of sleep at night and he may take a nap during the daytime.  He is sensitive to his seizure medications.  He stopped working in the spring 2023 and he has not had a lot to do at home.  They are moving to [place] so he can be engaged in a day program called shared living which has activities during the day and clients that may have other medical issues allowing him to have a community.  Mom states he has not had significant life trauma in his life.  Home specifically she is  and main caregiver and he lives with her.  Dad intermittently does see him and they are able to spend time together.     Driving:  Currently patient is:  Not driving.  Previous Evaluations for Epilepsy:    EE2023: CLINICAL INTERPRETATION   The EEG showed:   1) Rare Cz spikes during sleep that may suggest a tendency for focal   seizures in the appropriate clinical context.   2) Mild nonspecific diffuse slowing of the background.     No typical events were captured during the recording period.     EEG CLASSIFICATION   SPECIAL STUDY - Long-term video EEG monitoring.   Dysrhythmia grade 1 generalized.   Sleep - activation of Cz spikes.   EKG channel.     EEG REPORT   Background   The awake recording revealed 11 Hz posterior dominant rhythm.  There is   abundant 6-7 hertz diffuse slowing.  The sleep recording contained normal   symmetric sleep architecture.        Discharges  "and seizures   INTERICTAL DISCHARGES: During sleep, there are rare low-amplitude midline   central (Cz) sharp wave discharges which at times occur in trains.       CLINICAL EVENTS:  During the monitoring session, the patient had no   clinical or push button events of concern.       The EKG was unremarkable.      Cz spike vs V-wave example    EE2023  INTERPRETATION AND CLINICAL CORRELATE   This computer-assisted prolonged video EEG recording showed:   1. Mild diffuse slowing which is a non specific indicator of a mild global   disturbance of cerebral function   2. Rare sleep activated midline central potentially epileptiform   discharges during sleep indicating a propensity for focal seizures from   this region   3. No events or seizures recorded.         MRI of Brain: 10/31/2022: FINDINGS: Unchanged since the prior study. No evidence of mass, heterotopia or focal cortical dysplasia. The body of the left hippocampus is slightly smaller than the right (350/84, 650/87,  3/55), but the T2 signal within the left hippocampus is normal. Therefore, mesial temporal sclerosis  is unlikely. Few tiny perivascular spaces in the rostral cerebral white matter. Mild to moderate  fluid in the right mastoid air cells and mild within the left. No other definite abnormality, with  the exam limited due to patient motion artifact.    IMPRESSION:  Cannot entirely exclude left-sided mesial temporal sclerosis. Otherwise nothing  structural to explain the patient's seizures.   Exam:    Ht 5' 5\" (165.1 cm)   Wt 250 lb (113.4 kg)   BMI 41.60 kg/m       Wt Readings from Last 5 Encounters:   24 250 lb (113.4 kg)   23 255 lb 3.2 oz (115.8 kg)       Alert, cognitive delay, smiling, cooperative,  Down syndrome's features, face is symmetric, no upper or lower  extremity weakness, wide-based gait.   Stable gait. 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  He has had recurrent stereotyped spells since 2018 consisting of staring, partial responsiveness, nonrhythmic upper and lower extremity shaking, hip thrusting, back arching which may last 30 to 60 minutes in duration. MRI of the brain had some questionable changes in the left mesial  region but it is not entirely clear if he has mesial temporal sclerosis, however, not definite, EEGs are notable for midline - central epileptiform discharges.Lamotrigine has stopped his spells. Last spell was 12/2023. We will continue to increase lamotrigine 200-100 and then wean off lacosamide since he has severe fatigue.     Additionally may be helpful to try to find a therapist who works with clients with Down syndrome to identify strategies to calm him down, have hobbies or activities that may be  be stress relieving for him.  They are also planning to move to [place] at which point he will be involved in a center for daily activities which will be very helpful for him emotionally. Lastly I encouraged mom to talk to primary care doctor to complete a cardiac evaluation to rule out cardiac etiology causing him to collapse, especially because he has Down syndrome.  And they have pending genetics consult.       Plan :     Continue to increase lamotrigine to 200 mg morning and 100 mg evening (lamotrigine 100 mg tablet)   Continue lacosamide (200 mg tablet) 200 mg twice a day  Consult with primary care provider to get cardiac holter to rule out arrthymia   Participating in an active day program with socialization will be very helpful in reducing the spells ([place])   Follow up  with genetics consult because of strong family history of  seizures    Increase lamotrigine         Times of Days           Medication Tablet Size Number of Tablets/Capsules Notes      Continue Lamotrigine  Increase         7 AM after breakfast  (Morning)   6 PM after dinner  (Night)        Week 8      75 mg   (3 tablets)   75 mg   (3 tablets)   3/11/2024 make change   Week  9   100 mg   (4 tablets)  75 mg   (3 tablets)  3/18/2024 make change   Week 10   100 mg   (4 tablets)  100 mg   (4 tablets)  3/25/2024 make change   Week 11   125 mg   (5 tablets)  100 mg   (4 tablets)  4/1/2024 make change   Week 12   150 mg     100 mg    4/8/2024 make change   Week 13   175 mg     100 mg     4/15/2024 make change   Week 14   200 mg     100 mg     4/22/2024 make change     Monitor for side effects, especially rash (such as [place] Arnold rash which can be life threatening) and mood changes, if any concerns please call our office. [phone] if he has a rash - if he has a rash he will need to take loratadine 10 mg daily, reduce lamotrigine to week prior, and call [facility], take a picture of the rash and send to [facility]      Follow up  Olivia in 6-10 weeks via video visit or phone call.  Once lamotrigine is optimized, check lamotrigine level. Then on visit with Elise she can give a wean off lacosamide schedule. Then with Dr. Charles next available.       I spent 36 minutes in total today to provide comprehensive  medical care.   I spent 4 minutes writing the note and placing orders.   I spent 2 minutes  reviewing the chart.     The rest of the time was spent with the patient in face to face interview. During this time key medical decisions were made with review of medical chart prior to visit, visit with patient, counseling/education, and post visit work, including documentation of note on the day of visit. I addressed all questions the patient/caregiver raised in regards to epilepsy or related medical questions.       Letha Charles MD   Epilepsy Staff

## 2024-03-13 NOTE — PATIENT INSTRUCTIONS
Impression:    Recurrent seizure like spells (he may have both seizure and non epileptic spells)   Down Syndrome   Abnormal EEG with midline epileptiform discharges     Discussion:   Juan Pablo is 23 right-handed male with Down syndrome, recurrent seizure like spells, and abnormal EEG with midline epileptiform discharges.  He has had recurrent stereotyped spells since 2018 consisting of staring, partial responsiveness, nonrhythmic upper and lower extremity shaking, hip thrusting, back arching which may last 30 to 60 minutes in duration. MRI of the brain had some questionable changes in the left mesial  region but it is not entirely clear if he has mesial temporal sclerosis, however, not definite, EEGs are notable for midline - central epileptiform discharges.Lamotrigine has stopped his spells. Last spell was 12/2023. We will continue to increase lamotrigine 200-100 and then wean off lacosamide since he has severe fatigue.     Additionally may be helpful to try to find a therapist who works with clients with Down syndrome to identify strategies to calm him down, have hobbies or activities that may be  be stress relieving for him.  They are also planning to move to [place] at which point he will be involved in a center for daily activities which will be very helpful for him emotionally. Lastly I encouraged mom to talk to primary care doctor to complete a cardiac evaluation to rule out cardiac etiology causing him to collapse, especially because he has Down syndrome.  And they have pending genetics consult.       Plan :     Continue to increase lamotrigine to 200 mg morning and 100 mg evening (lamotrigine 100 mg tablet)   Continue lacosamide (200 mg tablet) 200 mg twice a day  Consult with primary care provider to get cardiac holter to rule out arrthymia   Participating in an active day program with socialization will be very helpful in reducing the spells ([place])   Follow up  with genetics consult because of  strong family history of seizures    Increase lamotrigine         Times of Days           Medication Tablet Size Number of Tablets/Capsules Notes      Continue Lamotrigine  Increase         7 AM after breakfast  (Morning)   6 PM after dinner  (Night)        Week 8      75 mg   (3 tablets)   75 mg   (3 tablets)   3/11/2024 make change   Week  9   100 mg   (4 tablets)  75 mg   (3 tablets)  3/18/2024 make change   Week 10   100 mg   (4 tablets)  100 mg   (4 tablets)  3/25/2024 make change   Week 11   125 mg   (5 tablets)  100 mg   (4 tablets)  4/1/2024 make change   Week 12   150 mg     100 mg    4/8/2024 make change   Week 13   175 mg     100 mg     4/15/2024 make change   Week 14   200 mg     100 mg     4/22/2024 make change     Monitor for side effects, especially rash (such as Gu[place] Arnold rash which can be life threatening) and mood changes, if any concerns please call our office. [phone] if he has a rash - if he has a rash he will need to take loratadine 10 mg daily, reduce lamotrigine to week prior, and call [facility], take a picture of the rash and send to [facility]      Follow up  Olivia in 6-10 weeks via video visit or phone call.  Once lamotrigine is optimized, check lamotrigine level. Then on visit with Elise she can give a wean off lacosamide schedule. Then with Dr. Charles next available.

## 2024-03-13 NOTE — LETTER
"3/13/2024       RE: Clement George  : 2000   MRN: [id number]        Dear Colleague,    Thank you for referring your patient, Clement George, to the [facility] EPILEPSY CARE at [facility]. Please see a copy of my visit note below.      [facility]    Patient:  Clement George  :  2000   Age:  23 year old   Today's Office Visit:  3/13/2024       Interval History:   Kendra (mom and POA) was present for this visit. We started lamotrigine last visit. He stopped having spells, his last spell was . Most spells were at at night or early morning. Currently, he is taking lamotrigine 75 mg twice a day. On lamotrigine, mom states he denies experiencing dizziness, no double vision, no nausea, no vomiting, no abdominal pain, no mood changes, no ER visits, no hospitalizations, and had no significant fall with trauma.   He has a hard time expressing himself when he is upset (cognitive delay), mom states \"if he can not express what he wants to say and he bottles it up, he gets upset, and then he has a spell. He is tried from lacosamide. He takes 1 hour nap per day. We are moving to  and we will get him into counseling to see if this helps. He is excited about our move. He is trying to express himself. In  he will be in a day program and have more social activities\".  He is currently on lacosamide 200 mg twice a day and is very sleepy on it.   He does not have a known history of generalized tonic-clonic seizures or episodes of staring with unresponsiveness.  They tried to do holter monitor and Clement did not tolerate holter monitor. Mom will work on cardiac evaluation as tolerated by Clement in . Mom tired to calm him down during event 2023 she focused on calming him down, breathing, opening and closing his hand to reconnect with his body. This was not successful and did not seem to help. " "  Spell/Seizure type 1:  Target spells are described as no warning sign and he may fall off a chair or get to the ground when he has the spells.  During the time his eyes are open, he has nonrhythmic shaking of his upper and lower extremity with some truncal and axial shaking.  There is no obvious oral or hand automatisms, there is no foaming at the mouth, there is no tongue biting, there is no bodily injury.  There was rare instances where he fell off a chair and might have scraped his knee.  The spells are especially noted to happen early morning after waking up or late in the evening around suppertime.  Mom has not specifically noticed that he has more spells during acute stress times.   During target spells if mom were to ask him to squeeze her hand he is able to squeeze her hand and does appear to have a maintain level of awareness. Last spell was 12/2023. Duration 30-60 minutes.  Other descriptions in the medical notes are \"They are typified by staring and reduced awareness with a possible right head turn and possibly back arching and hip thrusting. Eyes are open with extremity twitching lasting 5-10 seconds but then waxing and waning and occurring intermittently over a period of about 45 minutes.\"     Current antiepileptic drug   Lamotrigine (25 mg tablet) - 3 tablet twice a day   Lacosamide (200 mg tablet ) - 1 tablet twice a day   Current Outpatient Medications   Medication Sig Dispense Refill    lacosamide (VIMPAT) 200 MG TABS tablet Take 200 mg by mouth 2 times daily      lamoTRIgine (LAMICTAL) 100 MG tablet 200 mg morning and 100 mg 270 tablet 3    lamoTRIgine (LAMICTAL) 25 MG tablet Lamotrigine IR Script: Week 1-2: lamotrigine 25 mg am, week 3-4: 25 mg twice a day, week 5: 50 mg am and 25 mg pm. Week 6: 50 mg twice a day, week 7: 75 mg am and 50 mg pm, week 8: 75 mg am and 75 mg pm. week 9: 100 mg am and 75 mg pm. Week 10: 100 mg am and 100 mg pm. Week 11: 125 mg am and 100 mg pm. Week 12: 150 mg am and " 100 mg pm. Week 13: 175 mg am and 100 mg pm. Week 14 -onward: 200 mg am and 100 mg pm 200 tablet 1     Perceived AED Side Effects:  Yes  Medication Notes:   AED Medication Compliance:  compliant most of the time  Using a pill box:  Yes  Past AEDs:  Levetiracetam   Topiramate   Depakote 750 mg twice a day - gained weight  Psycho-Social History: Clement George currently lives with mom. He would like to work and be busy. Mom is not sure if he is depressed. Does not smoke, no alcohol use, no recreational drug use. We reviewed importance of mental and emotional wellbeing and impact on health. He stopped working May 2023. y 12 hours of sleep at night and he may take a nap during the daytime.  He is sensitive to his seizure medications.  He stopped working in the spring 2023 and he has not had a lot to do at home.  They are moving to [place] so he can be engaged in a day program called shared living which has activities during the day and clients that may have other medical issues allowing him to have a community.  Mom states he has not had significant life trauma in his life.  Home specifically she is  and main caregiver and he lives with her.  Dad intermittently does see him and they are able to spend time together.     Driving:  Currently patient is:  Not driving.  Previous Evaluations for Epilepsy:    EE2023: CLINICAL INTERPRETATION   The EEG showed:   1) Rare Cz spikes during sleep that may suggest a tendency for focal   seizures in the appropriate clinical context.   2) Mild nonspecific diffuse slowing of the background.     No typical events were captured during the recording period.     EEG CLASSIFICATION   SPECIAL STUDY - Long-term video EEG monitoring.   Dysrhythmia grade 1 generalized.   Sleep - activation of Cz spikes.   EKG channel.     EEG REPORT   Background   The awake recording revealed 11 Hz posterior dominant rhythm.  There is   abundant 6-7 hertz diffuse slowing.  The sleep  "recording contained normal   symmetric sleep architecture.        Discharges and seizures   INTERICTAL DISCHARGES: During sleep, there are rare low-amplitude midline   central (Cz) sharp wave discharges which at times occur in trains.       CLINICAL EVENTS:  During the monitoring session, the patient had no   clinical or push button events of concern.       The EKG was unremarkable.      Cz spike vs V-wave example    EE2023  INTERPRETATION AND CLINICAL CORRELATE   This computer-assisted prolonged video EEG recording showed:   1. Mild diffuse slowing which is a non specific indicator of a mild global   disturbance of cerebral function   2. Rare sleep activated midline central potentially epileptiform   discharges during sleep indicating a propensity for focal seizures from   this region   3. No events or seizures recorded.         MRI of Brain: 10/31/2022: FINDINGS: Unchanged since the prior study. No evidence of mass, heterotopia or focal cortical dysplasia. The body of the left hippocampus is slightly smaller than the right (350/84, 650/87,  3/55), but the T2 signal within the left hippocampus is normal. Therefore, mesial temporal sclerosis  is unlikely. Few tiny perivascular spaces in the rostral cerebral white matter. Mild to moderate  fluid in the right mastoid air cells and mild within the left. No other definite abnormality, with  the exam limited due to patient motion artifact.    IMPRESSION:  Cannot entirely exclude left-sided mesial temporal sclerosis. Otherwise nothing  structural to explain the patient's seizures.   Exam:    Ht 5' 5\" (165.1 cm)   Wt 250 lb (113.4 kg)   BMI 41.60 kg/m       Wt Readings from Last 5 Encounters:   24 250 lb (113.4 kg)   23 255 lb 3.2 oz (115.8 kg)       Alert, cognitive delay, smiling, cooperative,  Down syndrome's features, face is symmetric, no upper or lower  extremity weakness, wide-based gait.   Stable gait. 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  He has had recurrent stereotyped spells since 2018 consisting of staring, partial responsiveness, nonrhythmic upper and lower extremity shaking, hip thrusting, back arching which may last 30 to 60 minutes in duration. MRI of the brain had some questionable changes in the left mesial  region but it is not entirely clear if he has mesial temporal sclerosis, however, not definite, EEGs are notable for midline - central epileptiform discharges.Lamotrigine has stopped his spells. Last spell was 12/2023. We will continue to increase lamotrigine 200-100 and then wean off lacosamide since he has severe fatigue.     Additionally may be helpful to try to find a therapist who works with clients with Down syndrome to identify strategies to calm him down, have hobbies or activities that may be  be stress relieving for him.  They are also planning to move to [place] at which point he will be involved in a center for daily activities which will be very helpful for him emotionally. Lastly I encouraged mom to talk to primary care doctor to complete a cardiac evaluation to rule out cardiac etiology causing him to collapse, especially because he has Down syndrome.  And they have pending genetics consult.       Plan :     Continue to increase lamotrigine to 200 mg morning and 100 mg evening (lamotrigine 100 mg tablet)   Continue lacosamide (200 mg tablet) 200 mg twice a day  Consult with primary care provider to get cardiac holter to rule out arrthymia   Participating in an active day program with socialization will be very helpful in reducing the spells ([place])   Follow up  with genetics consult because of strong family history of seizures    Increase lamotrigine         Times of Days           Medication Tablet Size Number of Tablets/Capsules Notes      Continue Lamotrigine  Increase         7 AM after breakfast  (Morning)   6 PM after dinner  (Night)        Week 8      75 mg   (3 tablets)   75 mg   (3 tablets)   3/11/2024 make change   Week  9   100 mg   (4 tablets)  75 mg   (3 tablets)  3/18/2024 make change   Week 10   100 mg   (4 tablets)  100 mg   (4 tablets)  3/25/2024 make change   Week 11   125 mg   (5 tablets)  100 mg   (4 tablets)  4/1/2024 make change   Week 12   150 mg     100 mg    4/8/2024 make change   Week 13   175 mg     100 mg     4/15/2024 make change   Week 14   200 mg     100 mg     4/22/2024 make change     Monitor for side effects, especially rash (such as Guanakito Arnold rash which can be life threatening) and mood changes, if any concerns please call our office. [phone] if he has a rash - if he has a rash he will need to take loratadine 10 mg daily, reduce lamotrigine to week prior, and call [facility], take a picture of the rash and send to [facility]      Follow up  Olivia in 6-10 weeks via video visit or phone call.  Once lamotrigine is optimized, check lamotrigine level. Then on visit with Elise she can give a wean off lacosamide schedule. Then with Dr. Charles next available.       I spent 36 minutes in total today to provide comprehensive  medical care.   I spent 4 minutes writing the note and placing orders.   I spent 2 minutes  reviewing the chart.     The rest of the time was spent with the patient in face to face interview. During this time key medical decisions were made with review of medical chart prior to visit, visit with patient, counseling/education, and post visit work, including documentation of note on the day of visit. I addressed all questions the patient/caregiver raised in regards to epilepsy or related medical  questions.         Again, thank you for allowing me to participate in the care of your patient.      Sincerely,    Letha Charles MD

## 2024-03-28 DIAGNOSIS — G40.909 SEIZURE DISORDER (H): ICD-10-CM

## 2024-03-28 RX ORDER — LAMOTRIGINE 25 MG/1
TABLET ORAL
Qty: 200 TABLET | Refills: 1 | Status: SHIPPED | OUTPATIENT
Start: 2024-03-28 | End: 2024-08-01

## 2024-03-28 NOTE — TELEPHONE ENCOUNTER
Writer spoke to patients mom. Patient mom stated they are on week 11 of titration and have run out of the 25mg tablets. They have enough 100mg, but are needing more of the 25mg. Writer will send prescription to updated pharmacy.

## 2024-03-28 NOTE — TELEPHONE ENCOUNTER
Patient is out of medication, asking that script be sent asap. Mother is asking for a call back once this has completed.

## 2024-06-04 DIAGNOSIS — G40.909 SEIZURE DISORDER (H): Primary | ICD-10-CM

## 2024-06-04 NOTE — TELEPHONE ENCOUNTER
Call placed to patients mom. Per mom, patient has had rescue medication Clonazepam 1 mg dissolving tablet prescribed to him in the past by who she believes was a Dr. Daugherty. She describes that patient very rarely uses the medication but because he is in an [facility] program he is required to have an active prescription of it. She was wondering if Dr. Charles can fill this for her as the previous prescription is . I told mom that I would contact Dr. Charles and ask about this and give her a call back. Mom verbalized understanding and had no further questions.

## 2024-06-04 NOTE — TELEPHONE ENCOUNTER
Incoming call from patients mother, requesting a prescription for clonazepam medication 1 ML a dissolving medication.  Mother is requesting a recovery medication per RHD program that the patient is in. Patient is out of this medication.   Kendra is requesting a call back at [phone].

## 2024-06-05 NOTE — TELEPHONE ENCOUNTER
Kendra called numberous times yesterday regarding the RX for ema's rescue medication which we have not prescribed, this has been address, Kendra called this morning upset that it has been 13 hours and this rx has not been sent, Tianna spoke with mother and informed her that we sent a message to DR Charles and we are waiting to hear back

## 2024-06-05 NOTE — TELEPHONE ENCOUNTER
On chart review it looks like he has had Nayzilam in the past (not sure on how he responded). This may be a more appropriate rescue medication. I have not used clonazepam in the past as a rescue, and would defer to Dr Charles if she is comfortable with this.   Olivia Christensen PA-C

## 2024-06-05 NOTE — TELEPHONE ENCOUNTER
Patient currently has Diazepam but can't use this at [facility] program due to this medication making him sleepy. Hoping to get the dissolvable Clonazepam as that is what he has been taking. Patient is not able to return to D program due to not having medication. Would like medication sent to [facility], [place], [place].

## 2024-06-06 RX ORDER — CLONAZEPAM 1 MG/1
1 TABLET ORAL
Status: CANCELLED | OUTPATIENT
Start: 2024-06-06

## 2024-06-06 RX ORDER — CLONAZEPAM 1 MG/1
1 TABLET, ORALLY DISINTEGRATING ORAL PRN
Qty: 20 TABLET | Refills: 3 | Status: SHIPPED | OUTPATIENT
Start: 2024-06-06

## 2024-06-06 NOTE — TELEPHONE ENCOUNTER
Call placed to Kendra to update her that the prescription had been sent by           No other questions at this time

## 2024-06-10 ENCOUNTER — TELEPHONE (OUTPATIENT)
Dept: NEUROLOGY | Facility: CLINIC | Age: 24
End: 2024-06-10

## 2024-06-10 NOTE — TELEPHONE ENCOUNTER
Received Seizure Protocol Form to be completed. Form saved to [email or web address], encounter routed.  Melany Zimmerman CMA

## 2024-06-18 ENCOUNTER — TRANSFERRED RECORDS (OUTPATIENT)
Dept: HEALTH INFORMATION MANAGEMENT | Facility: CLINIC | Age: 24
End: 2024-06-18
Payer: COMMERCIAL

## 2024-07-09 ENCOUNTER — TELEPHONE (OUTPATIENT)
Dept: NEUROLOGY | Facility: CLINIC | Age: 24
End: 2024-07-09

## 2024-07-09 DIAGNOSIS — G40.909 SEIZURE DISORDER (H): Primary | ICD-10-CM

## 2024-07-09 NOTE — TELEPHONE ENCOUNTER
Ernestine mother called asking about the medication lowering of the locosimine, Ernestine mom is asking for a call back to discuss this as it was discussed in the December appointment. Mom states that patient is really tired.     Phone number verified.

## 2024-08-01 ENCOUNTER — VIRTUAL VISIT (OUTPATIENT)
Dept: NEUROLOGY | Facility: CLINIC | Age: 24
End: 2024-08-01
Payer: MEDICAID

## 2024-08-01 DIAGNOSIS — G40.909 SEIZURE DISORDER (H): ICD-10-CM

## 2024-08-01 DIAGNOSIS — R40.4 NONSPECIFIC PAROXYSMAL SPELL: ICD-10-CM

## 2024-08-01 RX ORDER — LEVOTHYROXINE SODIUM 137 UG/1
1 TABLET ORAL EVERY MORNING
COMMUNITY
Start: 2024-06-03

## 2024-08-01 RX ORDER — LAMOTRIGINE 100 MG/1
TABLET ORAL
Qty: 315 TABLET | Refills: 3 | Status: SHIPPED | OUTPATIENT
Start: 2024-08-01 | End: 2024-08-21

## 2024-08-01 RX ORDER — LACOSAMIDE 200 MG/1
200 TABLET ORAL AT BEDTIME
Qty: 90 TABLET | Refills: 1 | Status: SHIPPED | OUTPATIENT
Start: 2024-08-01

## 2024-08-01 RX ORDER — LACOSAMIDE 150 MG/1
150 TABLET ORAL EVERY MORNING
Qty: 90 TABLET | Refills: 1 | Status: SHIPPED | OUTPATIENT
Start: 2024-08-01

## 2024-08-01 NOTE — PATIENT INSTRUCTIONS
Plan :     Increase  lamotrigine to 250 mg morning and 100 mg evening (lamotrigine 100 mg tablet)   Lower lacosamide (150 mg tablet and 200 mg tablet) 150 mg morning and 200 mg evening since fatigue.   Consult with primary care provider to get cardiac holter to rule out arrthymia (this was recommended,   Participating in an active day program with socialization will be very helpful in reducing the spells ([place] falls)       With increase in lamotrigine monitor for side effects - rash.   Monitor for side effects, especially rash (such as Guanakito Arnold rash which can be life threatening) and mood changes, if any concerns please call our office. [phone] if he has a rash - if he has a rash he will need to take loratadine 10 mg daily, reduce lamotrigine to week prior, and call [facility], take a picture of the rash and send to [facility]      Follow up  [facility] doctor 2 months . Follow up with Dr. Shakira Charles MD

## 2024-08-01 NOTE — PROGRESS NOTES
"Is the patient currently in the state of [place]?    If the video visit is dropped, the invitation should be resent by: Text to cell phone: [phone]    Will anyone else be joining the visit? Yes: Mother. How would they like to receive their invitation? Text to cell phone: [phone]      How would you like to obtain your AVS? Mail a copy    Virtual Visit Details    Type of service:  Video Visit and telphone. The video stopped working we switched to phone  Video Start: 11:53 am   Video End: 12:17 pm   Originating Location (pt. Location): Home    Distant Location (provider location):  On-site  Platform used for Video Visit: [facility]    Patient:  Clement George  :  2000   Age:  24 year old   Today's Office Visit:  2024      Interval History:   Kendra (mom and POA) was present for this visit. We increased lamotrigine, his last seizure was 1.5 months ago. He is fatigued and mom was wondering if we can lower lacosamide. Mom states \"seizure are related loud noise, he was at the park and got hot, I wonder if this is partly behavioral\". Prior to increase in lamotrigine he had spell every other day. Lamotrigine has been helpful. Most spells were at at night or early morning. Currently, he is taking lamotrigine and lacosamide. On lamotrigine and lacosamide, mom states he denies experiencing dizziness, no double vision, no nausea, no vomiting, no abdominal pain, no mood changes, no ER visits, no hospitalizations, and had no significant fall with trauma.     Spell/Seizure type 1:  Target spells are described as no warning sign and he may fall off a chair or get to the ground when he has the spells.  During the time his eyes are open, he has nonrhythmic shaking of his upper and lower extremity with some truncal and axial shaking.  There is no obvious oral or hand automatisms, there is no foaming at the mouth, there is no tongue biting, there is no bodily injury.  There was " "rare instances where he fell off a chair and might have scraped his knee.  The spells are especially noted to happen early morning after waking up or late in the evening around suppertime.  Mom has not specifically noticed that he has more spells during acute stress times.   During target spells if mom were to ask him to squeeze her hand he is able to squeeze her hand and does appear to have a maintain level of awareness. Prior to higher doses of lamotrigine he had staring spell with shaking every other day.  Duration 30-60 minutes.  Other descriptions in the medical notes are \"They are typified by staring and reduced awareness with a possible right head turn and possibly back arching and hip thrusting. Eyes are open with extremity twitching lasting 5-10 seconds but then waxing and waning and occurring intermittently over a period of about 45 minutes.\"   Current antiepileptic drug   Lamotrigine (100 mg tablet) - 2 tablet morning and 1 tablet at night    Lacosamide (200 mg tablet ) - 1 tablet twice a day   Current Outpatient Medications   Medication Sig Dispense Refill    clonazePAM (KLONOPIN) 1 MG ODT tab Take 1 tablet (1 mg) by mouth as needed for seizures (any convulsion or more 2 smaller seizures within 8 hours. may repeat one tab for total 2 mg in 24 hour.) 20 tablet 3    lamoTRIgine (LAMICTAL) 100 MG tablet 200 mg morning and 100 mg 270 tablet 3    lamoTRIgine (LAMICTAL) 25 MG tablet Lamotrigine IR Script: Week 1-2: lamotrigine 25 mg am, week 3-4: 25 mg twice a day, week 5: 50 mg am and 25 mg pm. Week 6: 50 mg twice a day, week 7: 75 mg am and 50 mg pm, week 8: 75 mg am and 75 mg pm. week 9: 100 mg am and 75 mg pm. Week 10: 100 mg am and 100 mg pm. Week 11: 125 mg am and 100 mg pm. Week 12: 150 mg am and 100 mg pm. Week 13: 175 mg am and 100 mg pm. Week 14 -onward: 200 mg am and 100 mg pm 200 tablet 1    levothyroxine (SYNTHROID/LEVOTHROID) 137 MCG tablet Take 1 tablet by mouth every morning      lacosamide " (VIMPAT) 200 MG TABS tablet Take 1 tablet (200 mg) by mouth 2 times daily (Patient not taking: Reported on 2024) 180 tablet 1     Perceived AED Side Effects:  Yes  Medication Notes:   AED Medication Compliance:  compliant most of the time  Using a pill box:  Yes  Past AEDs:  Levetiracetam   Topiramate   Depakote 750 mg twice a day - gained weight  Psycho-Social History copied forward: Clement George currently lives with mom. He would like to work and be busy. Mom is not sure if he is depressed. Does not smoke, no alcohol use, no recreational drug use. We reviewed importance of mental and emotional wellbeing and impact on health. He stopped working May 2023. y 12 hours of sleep at night and he may take a nap during the daytime.  He is sensitive to his seizure medications.  He stopped working in the spring 2023 and he has not had a lot to do at home.  They are moving to [place] so he can be engaged in a day program called shared living which has activities during the day and clients that may have other medical issues allowing him to have a community.  Mom states he has not had significant life trauma in his life.  Home specifically she is  and main caregiver and he lives with her.  Dad intermittently does see him and they are able to spend time together.     Driving:  Currently patient is:  Not driving.  Previous Evaluations for Epilepsy:    EE2023: CLINICAL INTERPRETATION   The EEG showed:   1) Rare Cz spikes during sleep that may suggest a tendency for focal   seizures in the appropriate clinical context.   2) Mild nonspecific diffuse slowing of the background.     No typical events were captured during the recording period.     EEG CLASSIFICATION   SPECIAL STUDY - Long-term video EEG monitoring.   Dysrhythmia grade 1 generalized.   Sleep - activation of Cz spikes.   EKG channel.     EEG REPORT   Background   The awake recording revealed 11 Hz posterior dominant rhythm.  There is   abundant  6-7 hertz diffuse slowing.  The sleep recording contained normal   symmetric sleep architecture.        Discharges and seizures   INTERICTAL DISCHARGES: During sleep, there are rare low-amplitude midline   central (Cz) sharp wave discharges which at times occur in trains.       CLINICAL EVENTS:  During the monitoring session, the patient had no   clinical or push button events of concern.       The EKG was unremarkable.      Cz spike vs V-wave example    EE2023  INTERPRETATION AND CLINICAL CORRELATE   This computer-assisted prolonged video EEG recording showed:   1. Mild diffuse slowing which is a non specific indicator of a mild global   disturbance of cerebral function   2. Rare sleep activated midline central potentially epileptiform   discharges during sleep indicating a propensity for focal seizures from   this region   3. No events or seizures recorded.         MRI of Brain: 10/31/2022: FINDINGS: Unchanged since the prior study. No evidence of mass, heterotopia or focal cortical dysplasia. The body of the left hippocampus is slightly smaller than the right (350/84, 650/87,  3/55), but the T2 signal within the left hippocampus is normal. Therefore, mesial temporal sclerosis  is unlikely. Few tiny perivascular spaces in the rostral cerebral white matter. Mild to moderate  fluid in the right mastoid air cells and mild within the left. No other definite abnormality, with  the exam limited due to patient motion artifact.    IMPRESSION:  Cannot entirely exclude left-sided mesial temporal sclerosis. Otherwise nothing  structural to explain the patient's seizures.   Exam:    There were no vitals taken for this visit.     Wt Readings from Last 5 Encounters:   24 250 lb (113.4 kg)   23 255 lb 3.2 oz (115.8 kg)       Alert, cognitive delay, smiling, cooperative,  Down syndrome's features, face is symmetric, no focal weakness.     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  He has had recurrent stereotyped spells since 2018 consisting of staring, partial responsiveness, nonrhythmic upper and lower extremity shaking, hip thrusting, back arching which may last 30 to 60 minutes in duration. MRI of the brain had some questionable changes in the left mesial  region but it is not entirely clear if he has mesial temporal sclerosis, however, not definite, EEGs are notable for midline - central epileptiform discharges. Lamotrigine has been helpful. He has fatigue on lacosamide, we will lower lacosamide dose. Last spell was 7/2024. Prior to higher doses of lamotrigine he had staring spell with shaking every other day. We will increase lamotrigine (last level was 4.9)  and lower lacosamide.     Additionally may be helpful to try to find a therapist who works with clients with Down syndrome to identify strategies to calm him down, have hobbies or activities that may be  be stress relieving for him.  They moved [place] at which point he is RHD program which has been great for him. The daily activities have been very helpful for him emotionally. Lastly, mom did see primary care provider and completed a cardiac evaluation to rule out cardiac etiology which was unremarkable.     Plan :     Increase  lamotrigine to 250 mg morning and 100 mg evening (lamotrigine 100 mg tablet)   Lower lacosamide (150 mg tablet and 200 mg tablet) 150 mg morning and 200 mg evening since fatigue.   Consult with primary care provider to get cardiac holter to rule out arrthymia (this was recommended,   Participating in an active day program with socialization will be very helpful in reducing the spells ([place])       With  increase in lamotrigine monitor for side effects - rash.   Monitor for side effects, especially rash (such as Guanakito Arnold rash which can be life threatening) and mood changes, if any concerns please call our office. [phone] if he has a rash - if he has a rash he will need to take loratadine 10 mg daily, reduce lamotrigine to week prior, and call [facility], take a picture of the rash and send to [facility]      Follow up  [facility] doctor 2 months . Follow up with Dr. Sahkira Leavitt         I spent 25 minutes in total today to provide comprehensive  medical care.   I spent 4 minutes writing the note and placing orders.   I spent 2 minutes  reviewing the chart.     The rest of the time was spent with the patient in face to face interview. During this time key medical decisions were made with review of medical chart prior to visit, visit with patient, counseling/education, and post visit work, including documentation of note on the day of visit. I addressed all questions the patient/caregiver raised in regards to epilepsy or related medical questions.       Letha Charles MD   Epilepsy Staff

## 2024-08-01 NOTE — LETTER
"2024       RE: Clement George  : 2000   MRN: [id number]      Dear Colleague,    Thank you for referring your patient, Clement George, to the [facility] EPILEPSY [facility]ARE at [facility]. Please see a copy of my visit note below.    Is the patient currently in the state of [place]?    If the video visit is dropped, the invitation should be resent by: Text to cell phone: [phone]    Will anyone else be joining the visit? Yes: Mother. How would they like to receive their invitation? Text to cell phone: [phone]      How would you like to obtain your AVS? Mail a copy    Virtual Visit Details    Type of service:  Video Visit and telphone. The video stopped working we switched to phone  Video Start: 11:53 am   Video End: 12:17 pm   Originating Location (pt. Location): Home    Distant Location (provider location):  On-site  Platform used for Video Visit: [facility] Progress Note    Patient:  Clement George  :  2000   Age:  24 year old   Today's Office Visit:  2024      Interval History:   Kendra (mom and POA) was present for this visit. We increased lamotrigine, his last seizure was 1.5 months ago. He is fatigued and mom was wondering if we can lower lacosamide. Mom states \"seizure are related loud noise, he was at the park and got hot, I wonder if this is partly behavioral\". Prior to increase in lamotrigine he had spell every other day. Lamotrigine has been helpful. Most spells were at at night or early morning. Currently, he is taking lamotrigine and lacosamide. On lamotrigine and lacosamide, mom states he denies experiencing dizziness, no double vision, no nausea, no vomiting, no abdominal pain, no mood changes, no ER visits, no hospitalizations, and had no significant fall with trauma.     Spell/Seizure type 1:  Target spells are described as no warning sign and he may fall off a chair or get to the ground when " "he has the spells.  During the time his eyes are open, he has nonrhythmic shaking of his upper and lower extremity with some truncal and axial shaking.  There is no obvious oral or hand automatisms, there is no foaming at the mouth, there is no tongue biting, there is no bodily injury.  There was rare instances where he fell off a chair and might have scraped his knee.  The spells are especially noted to happen early morning after waking up or late in the evening around suppertime.  Mom has not specifically noticed that he has more spells during acute stress times.   During target spells if mom were to ask him to squeeze her hand he is able to squeeze her hand and does appear to have a maintain level of awareness. Prior to higher doses of lamotrigine he had staring spell with shaking every other day.  Duration 30-60 minutes.  Other descriptions in the medical notes are \"They are typified by staring and reduced awareness with a possible right head turn and possibly back arching and hip thrusting. Eyes are open with extremity twitching lasting 5-10 seconds but then waxing and waning and occurring intermittently over a period of about 45 minutes.\"   Current antiepileptic drug   Lamotrigine (100 mg tablet) - 2 tablet morning and 1 tablet at night    Lacosamide (200 mg tablet ) - 1 tablet twice a day   Current Outpatient Medications   Medication Sig Dispense Refill     clonazePAM (KLONOPIN) 1 MG ODT tab Take 1 tablet (1 mg) by mouth as needed for seizures (any convulsion or more 2 smaller seizures within 8 hours. may repeat one tab for total 2 mg in 24 hour.) 20 tablet 3     lamoTRIgine (LAMICTAL) 100 MG tablet 200 mg morning and 100 mg 270 tablet 3     lamoTRIgine (LAMICTAL) 25 MG tablet Lamotrigine IR Script: Week 1-2: lamotrigine 25 mg am, week 3-4: 25 mg twice a day, week 5: 50 mg am and 25 mg pm. Week 6: 50 mg twice a day, week 7: 75 mg am and 50 mg pm, week 8: 75 mg am and 75 mg pm. week 9: 100 mg am and 75 mg pm. " Week 10: 100 mg am and 100 mg pm. Week 11: 125 mg am and 100 mg pm. Week 12: 150 mg am and 100 mg pm. Week 13: 175 mg am and 100 mg pm. Week 14 -onward: 200 mg am and 100 mg pm 200 tablet 1     levothyroxine (SYNTHROID/LEVOTHROID) 137 MCG tablet Take 1 tablet by mouth every morning       lacosamide (VIMPAT) 200 MG TABS tablet Take 1 tablet (200 mg) by mouth 2 times daily (Patient not taking: Reported on 2024) 180 tablet 1     Perceived AED Side Effects:  Yes  Medication Notes:   AED Medication Compliance:  compliant most of the time  Using a pill box:  Yes  Past AEDs:  Levetiracetam   Topiramate   Depakote 750 mg twice a day - gained weight  Psycho-Social History copied forward: Clement George currently lives with mom. He would like to work and be busy. Mom is not sure if he is depressed. Does not smoke, no alcohol use, no recreational drug use. We reviewed importance of mental and emotional wellbeing and impact on health. He stopped working May 2023. y 12 hours of sleep at night and he may take a nap during the daytime.  He is sensitive to his seizure medications.  He stopped working in the spring 2023 and he has not had a lot to do at home.  They are moving to [place] so he can be engaged in a day program called shared living which has activities during the day and clients that may have other medical issues allowing him to have a community.  Mom states he has not had significant life trauma in his life.  Home specifically she is  and main caregiver and he lives with her.  Dad intermittently does see him and they are able to spend time together.     Driving:  Currently patient is:  Not driving.  Previous Evaluations for Epilepsy:    EE2023: CLINICAL INTERPRETATION   The EEG showed:   1) Rare Cz spikes during sleep that may suggest a tendency for focal   seizures in the appropriate clinical context.   2) Mild nonspecific diffuse slowing of the background.     No typical events were captured  during the recording period.     EEG CLASSIFICATION   SPECIAL STUDY - Long-term video EEG monitoring.   Dysrhythmia grade 1 generalized.   Sleep - activation of Cz spikes.   EKG channel.     EEG REPORT   Background   The awake recording revealed 11 Hz posterior dominant rhythm.  There is   abundant 6-7 hertz diffuse slowing.  The sleep recording contained normal   symmetric sleep architecture.        Discharges and seizures   INTERICTAL DISCHARGES: During sleep, there are rare low-amplitude midline   central (Cz) sharp wave discharges which at times occur in trains.       CLINICAL EVENTS:  During the monitoring session, the patient had no   clinical or push button events of concern.       The EKG was unremarkable.      Cz spike vs V-wave example    EE2023  INTERPRETATION AND CLINICAL CORRELATE   This computer-assisted prolonged video EEG recording showed:   1. Mild diffuse slowing which is a non specific indicator of a mild global   disturbance of cerebral function   2. Rare sleep activated midline central potentially epileptiform   discharges during sleep indicating a propensity for focal seizures from   this region   3. No events or seizures recorded.         MRI of Brain: 10/31/2022: FINDINGS: Unchanged since the prior study. No evidence of mass, heterotopia or focal cortical dysplasia. The body of the left hippocampus is slightly smaller than the right (350/84, 650/87,  3/55), but the T2 signal within the left hippocampus is normal. Therefore, mesial temporal sclerosis  is unlikely. Few tiny perivascular spaces in the rostral cerebral white matter. Mild to moderate  fluid in the right mastoid air cells and mild within the left. No other definite abnormality, with  the exam limited due to patient motion artifact.    IMPRESSION:  Cannot entirely exclude left-sided mesial temporal sclerosis. Otherwise nothing  structural to explain the patient's seizures.   Exam:    There were no vitals taken for this  visit.     Wt Readings from Last 5 Encounters:   03/13/24 250 lb (113.4 kg)   12/18/23 255 lb 3.2 oz (115.8 kg)       Alert, cognitive delay, smiling, cooperative,  Down syndrome's features, face is symmetric, no focal weakness.     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  He has had recurrent stereotyped spells since 2018 consisting of staring, partial responsiveness, nonrhythmic upper and lower extremity shaking, hip thrusting, back arching which may last 30 to 60 minutes in duration. MRI of the brain had some questionable changes in the left mesial  region but it is not entirely clear if he has mesial temporal sclerosis, however, not definite, EEGs are notable for midline - central epileptiform discharges. Lamotrigine has been helpful. He has fatigue on lacosamide, we will lower lacosamide dose. Last spell was 7/2024. Prior to higher doses of lamotrigine he had staring spell with shaking every other day. We will increase lamotrigine (last level was 4.9)  and lower lacosamide.     Additionally may be helpful to try to find a therapist who works with clients with Down syndrome to identify strategies to calm him down, have hobbies or activities that may be  be stress relieving for him.  They moved [place] at which point he is RHD program which has been great for him. The daily activities have been very helpful for him emotionally. Lastly, mom did see primary care provider and completed a cardiac evaluation to rule out cardiac etiology which was unremarkable.     Plan :     Increase  lamotrigine to 250 mg morning and 100 mg evening (lamotrigine 100 mg tablet)   Lower lacosamide (150 mg tablet and  200 mg tablet) 150 mg morning and 200 mg evening since fatigue.   Consult with primary care provider to get cardiac holter to rule out arrthymia (this was recommended,   Participating in an active day program with socialization will be very helpful in reducing the spells ([place])       With increase in lamotrigine monitor for side effects - rash.   Monitor for side effects, especially rash (such as Guanakito Arnold rash which can be life threatening) and mood changes, if any concerns please call our office. [phone] if he has a rash - if he has a rash he will need to take loratadine 10 mg daily, reduce lamotrigine to week prior, and call [facility], take a picture of the rash and send to [facility]      Follow up  [facility] doctor 2 months . Follow up with Dr. Shakira Leavitt         I spent 25 minutes in total today to provide comprehensive  medical care.   I spent 4 minutes writing the note and placing orders.   I spent 2 minutes  reviewing the chart.     The rest of the time was spent with the patient in face to face interview. During this time key medical decisions were made with review of medical chart prior to visit, visit with patient, counseling/education, and post visit work, including documentation of note on the day of visit. I addressed all questions the patient/caregiver raised in regards to epilepsy or related medical questions.       Letha Charles MD   Epilepsy Staff                    Again, thank you for allowing me to participate in the care of your patient.      Sincerely,    Letha Charles MD

## 2024-08-21 ENCOUNTER — TELEPHONE (OUTPATIENT)
Dept: NEUROLOGY | Facility: CLINIC | Age: 24
End: 2024-08-21

## 2024-08-21 DIAGNOSIS — G40.909 SEIZURE DISORDER (H): Primary | ICD-10-CM

## 2024-08-21 RX ORDER — LAMOTRIGINE 25 MG/1
50 TABLET, CHEWABLE ORAL EVERY MORNING
Qty: 180 TABLET | Refills: 3 | Status: SHIPPED | OUTPATIENT
Start: 2024-08-21 | End: 2024-08-21 | Stop reason: ALTCHOICE

## 2024-08-21 RX ORDER — LAMOTRIGINE 100 MG/1
TABLET ORAL
Qty: 270 TABLET | Refills: 3 | Status: SHIPPED | OUTPATIENT
Start: 2024-08-21

## 2024-08-21 RX ORDER — LAMOTRIGINE 25 MG/1
50 TABLET ORAL EVERY MORNING
Qty: 180 TABLET | Refills: 3 | Status: SHIPPED | OUTPATIENT
Start: 2024-08-21

## 2024-08-21 NOTE — TELEPHONE ENCOUNTER
Kendra patient's mother is calling to ask for a rx called to pharmacy for the 25mg dose of the   Lamotrigine, she stated that it is to hard to cut the 100mgs tabs in half for his morning dose

## 2024-08-21 NOTE — TELEPHONE ENCOUNTER
Lamotrigine prescription updated per mom request to get 25 mg tablets in order to not have to split 100 mg tablets.     Prescription of 100 mg tablets changed to (2 tablets AM 1 tablet PM)  Prescription of 25 mg tablets ordered for 2 tablets AM  Total daily 250-100    Call placed to mom to inform of updated prescriptions sent to pharmacy. Mom verbalized understanding and had no questions.

## 2024-10-06 ENCOUNTER — HEALTH MAINTENANCE LETTER (OUTPATIENT)
Age: 24
End: 2024-10-06

## 2024-11-04 ENCOUNTER — TELEPHONE (OUTPATIENT)
Dept: NEUROLOGY | Facility: CLINIC | Age: 24
End: 2024-11-04

## 2025-03-24 ENCOUNTER — TELEPHONE (OUTPATIENT)
Dept: NEUROLOGY | Facility: CLINIC | Age: 25
End: 2025-03-24

## 2025-03-24 NOTE — TELEPHONE ENCOUNTER
Prior Authorization Retail Medication Request    Medication/Dose: Lacosamide 150 MG Tablets  Diagnosis and ICD code (if different than what is on RX):    New/renewal/insurance change PA/secondary ins. PA:  Previously Tried and Failed:  See Chart  Rationale:  See Chart    Insurance   Primary:   Insurance ID:      Secondary (if applicable):  Insurance ID:    Pharmacy Information (if different than what is on RX)  Name:    Phone:    Fax:    Clinic Information  Preferred routing pool for dept communication: RAN LEONARD RN POOL

## 2025-03-24 NOTE — TELEPHONE ENCOUNTER
Prior Authorization Retail Medication Request    Medication/Dose: Lacosamide 200 MG Tablets  Diagnosis and ICD code (if different than what is on RX):    New/renewal/insurance change PA/secondary ins. PA:  Previously Tried and Failed:  See Chart  Rationale:  See Chart    Insurance   Primary:   Insurance ID:      Secondary (if applicable):  Insurance ID:      Pharmacy Information (if different than what is on RX)  Name:    Phone:    Fax:    Clinic Information  Preferred routing pool for dept communication: [facility]ON[facility] RN POOL

## 2025-03-26 NOTE — TELEPHONE ENCOUNTER
Prior Authorization Not Needed per Insurance    Medication: LACOSAMIDE 150 MG PO TABS  Insurance Company: [employer] Part D - Phone [phone] Fax [phone]  Expected CoPay: $    [place] [facility] [place] [address]  Pharmacy Notified: yes  Patient Notified: **Instructed pharmacy to notify patient when script is ready to /ship.**    Pharmacy just needed to update day supply to 30 days.

## 2025-03-26 NOTE — TELEPHONE ENCOUNTER
Prior Authorization Not Needed per Insurance    Medication: LACOSAMIDE 200 MG PO TABS  Insurance Company: [employer] Part D - Phone [phone] Fax [phone]  Expected CoPay: $    Pharmacy Filling the [place] [facility] [place] [address]  Pharmacy Notified: YES  Patient Notified: **Instructed pharmacy to notify patient when script is ready to /ship.**    Pharmacy just needed to update day supply to 30 days.

## 2025-07-18 NOTE — TELEPHONE ENCOUNTER
Call returned to patients mother, Kendra. At initial appointment, mom recalls Dr. Charles wanting to eventually wean patient off of Lacosamide once Lamotrigine is at goal dose. Mom is wanting to initiate this change now, if possible because she notices patient being very fatigued in his day to day.     Right now patient is taking 200 mg AM and 100 mg PM of lamotrigine   200 mg LAC BID. Lab orders are in to have blood work done. I told mom that I would send message to Dr. Charles to address this and would reach out to her. Mom verbalized understanding and had no further questions     Please review labs.

## 2025-08-13 DIAGNOSIS — G40.909 SEIZURE DISORDER (H): ICD-10-CM

## 2025-08-13 RX ORDER — LAMOTRIGINE 25 MG/1
50 TABLET ORAL EVERY MORNING
Qty: 180 TABLET | Refills: 1 | Status: SHIPPED | OUTPATIENT
Start: 2025-08-13